# Patient Record
Sex: FEMALE | Race: WHITE | NOT HISPANIC OR LATINO | URBAN - METROPOLITAN AREA
[De-identification: names, ages, dates, MRNs, and addresses within clinical notes are randomized per-mention and may not be internally consistent; named-entity substitution may affect disease eponyms.]

---

## 2017-03-23 ENCOUNTER — COMMUNICATION - HEALTHEAST (OUTPATIENT)
Dept: MIDWIFE SERVICES | Facility: CLINIC | Age: 32
End: 2017-03-23

## 2017-03-23 ENCOUNTER — COMMUNICATION - HEALTHEAST (OUTPATIENT)
Dept: OBGYN | Facility: CLINIC | Age: 32
End: 2017-03-23

## 2017-07-19 ENCOUNTER — OFFICE VISIT - HEALTHEAST (OUTPATIENT)
Dept: FAMILY MEDICINE | Facility: CLINIC | Age: 32
End: 2017-07-19

## 2017-07-19 DIAGNOSIS — L98.9 SKIN LESION: ICD-10-CM

## 2017-07-19 DIAGNOSIS — L98.9 THUMB LESION: ICD-10-CM

## 2017-07-19 DIAGNOSIS — Z00.00 ROUTINE GENERAL MEDICAL EXAMINATION AT A HEALTH CARE FACILITY: ICD-10-CM

## 2017-07-19 DIAGNOSIS — F41.9 ANXIETY: ICD-10-CM

## 2017-07-19 DIAGNOSIS — F39 MOOD DISORDER (H): ICD-10-CM

## 2017-07-19 ASSESSMENT — MIFFLIN-ST. JEOR: SCORE: 1343.48

## 2017-07-24 ENCOUNTER — COMMUNICATION - HEALTHEAST (OUTPATIENT)
Dept: FAMILY MEDICINE | Facility: CLINIC | Age: 32
End: 2017-07-24

## 2017-07-24 DIAGNOSIS — F39 MOOD DISORDER (H): ICD-10-CM

## 2017-08-15 ENCOUNTER — RECORDS - HEALTHEAST (OUTPATIENT)
Dept: ADMINISTRATIVE | Facility: OTHER | Age: 32
End: 2017-08-15

## 2018-02-13 ENCOUNTER — COMMUNICATION - HEALTHEAST (OUTPATIENT)
Dept: FAMILY MEDICINE | Facility: CLINIC | Age: 33
End: 2018-02-13

## 2018-02-13 DIAGNOSIS — F39 MOOD DISORDER (H): ICD-10-CM

## 2018-05-14 ENCOUNTER — OFFICE VISIT - HEALTHEAST (OUTPATIENT)
Dept: FAMILY MEDICINE | Facility: CLINIC | Age: 33
End: 2018-05-14

## 2018-05-14 DIAGNOSIS — J32.9 SINUSITIS: ICD-10-CM

## 2018-08-21 ENCOUNTER — OFFICE VISIT - HEALTHEAST (OUTPATIENT)
Dept: FAMILY MEDICINE | Facility: CLINIC | Age: 33
End: 2018-08-21

## 2018-08-21 DIAGNOSIS — F41.9 ANXIETY: ICD-10-CM

## 2018-08-21 DIAGNOSIS — F32.4 MAJOR DEPRESSION IN PARTIAL REMISSION (H): ICD-10-CM

## 2018-08-21 DIAGNOSIS — Z00.00 ROUTINE GENERAL MEDICAL EXAMINATION AT A HEALTH CARE FACILITY: ICD-10-CM

## 2018-08-21 ASSESSMENT — MIFFLIN-ST. JEOR: SCORE: 1358.22

## 2018-10-17 ENCOUNTER — COMMUNICATION - HEALTHEAST (OUTPATIENT)
Dept: FAMILY MEDICINE | Facility: CLINIC | Age: 33
End: 2018-10-17

## 2019-01-07 ENCOUNTER — COMMUNICATION - HEALTHEAST (OUTPATIENT)
Dept: FAMILY MEDICINE | Facility: CLINIC | Age: 34
End: 2019-01-07

## 2019-01-07 DIAGNOSIS — F39 MOOD DISORDER (H): ICD-10-CM

## 2019-01-10 ENCOUNTER — COMMUNICATION - HEALTHEAST (OUTPATIENT)
Dept: FAMILY MEDICINE | Facility: CLINIC | Age: 34
End: 2019-01-10

## 2019-08-06 ENCOUNTER — OFFICE VISIT - HEALTHEAST (OUTPATIENT)
Dept: FAMILY MEDICINE | Facility: CLINIC | Age: 34
End: 2019-08-06

## 2019-08-06 DIAGNOSIS — F39 MOOD DISORDER (H): ICD-10-CM

## 2019-08-06 DIAGNOSIS — F41.9 ANXIETY: ICD-10-CM

## 2019-08-06 DIAGNOSIS — Z00.00 ROUTINE GENERAL MEDICAL EXAMINATION AT A HEALTH CARE FACILITY: ICD-10-CM

## 2019-08-06 ASSESSMENT — MIFFLIN-ST. JEOR: SCORE: 1372.29

## 2019-08-07 LAB
HPV SOURCE: NORMAL
HUMAN PAPILLOMA VIRUS 16 DNA: NEGATIVE
HUMAN PAPILLOMA VIRUS 18 DNA: NEGATIVE
HUMAN PAPILLOMA VIRUS FINAL DIAGNOSIS: NORMAL
HUMAN PAPILLOMA VIRUS OTHER HR: NEGATIVE
SPECIMEN DESCRIPTION: NORMAL

## 2019-08-13 LAB
BKR LAB AP ABNORMAL BLEEDING: NO
BKR LAB AP BIRTH CONTROL/HORMONES: NORMAL
BKR LAB AP CERVICAL APPEARANCE: NORMAL
BKR LAB AP GYN ADEQUACY: NORMAL
BKR LAB AP GYN INTERPRETATION: NORMAL
BKR LAB AP HPV REFLEX: NORMAL
BKR LAB AP LMP: NORMAL
BKR LAB AP PATIENT STATUS: NORMAL
BKR LAB AP PREVIOUS ABNORMAL: NORMAL
BKR LAB AP PREVIOUS NORMAL: 2014
HIGH RISK?: NO
PATH REPORT.COMMENTS IMP SPEC: NORMAL
RESULT FLAG (HE HISTORICAL CONVERSION): NORMAL

## 2019-09-18 ENCOUNTER — OFFICE VISIT - HEALTHEAST (OUTPATIENT)
Dept: FAMILY MEDICINE | Facility: CLINIC | Age: 34
End: 2019-09-18

## 2019-09-18 DIAGNOSIS — Z30.430 ENCOUNTER FOR IUD INSERTION: ICD-10-CM

## 2019-09-18 LAB — HCG UR QL: NEGATIVE

## 2019-11-07 ENCOUNTER — COMMUNICATION - HEALTHEAST (OUTPATIENT)
Dept: FAMILY MEDICINE | Facility: CLINIC | Age: 34
End: 2019-11-07

## 2020-02-28 ENCOUNTER — COMMUNICATION - HEALTHEAST (OUTPATIENT)
Dept: FAMILY MEDICINE | Facility: CLINIC | Age: 35
End: 2020-02-28

## 2020-03-02 ENCOUNTER — OFFICE VISIT - HEALTHEAST (OUTPATIENT)
Dept: FAMILY MEDICINE | Facility: CLINIC | Age: 35
End: 2020-03-02

## 2020-03-02 DIAGNOSIS — L70.0 ACNE VULGARIS: ICD-10-CM

## 2020-03-02 DIAGNOSIS — F39 MOOD DISORDER (H): ICD-10-CM

## 2020-03-02 ASSESSMENT — ANXIETY QUESTIONNAIRES
2. NOT BEING ABLE TO STOP OR CONTROL WORRYING: NOT AT ALL
GAD7 TOTAL SCORE: 1
1. FEELING NERVOUS, ANXIOUS, OR ON EDGE: NOT AT ALL
3. WORRYING TOO MUCH ABOUT DIFFERENT THINGS: NOT AT ALL
6. BECOMING EASILY ANNOYED OR IRRITABLE: SEVERAL DAYS
4. TROUBLE RELAXING: NOT AT ALL
5. BEING SO RESTLESS THAT IT IS HARD TO SIT STILL: NOT AT ALL
7. FEELING AFRAID AS IF SOMETHING AWFUL MIGHT HAPPEN: NOT AT ALL
IF YOU CHECKED OFF ANY PROBLEMS ON THIS QUESTIONNAIRE, HOW DIFFICULT HAVE THESE PROBLEMS MADE IT FOR YOU TO DO YOUR WORK, TAKE CARE OF THINGS AT HOME, OR GET ALONG WITH OTHER PEOPLE: NOT DIFFICULT AT ALL

## 2020-03-02 ASSESSMENT — PATIENT HEALTH QUESTIONNAIRE - PHQ9: SUM OF ALL RESPONSES TO PHQ QUESTIONS 1-9: 0

## 2020-08-16 ENCOUNTER — COMMUNICATION - HEALTHEAST (OUTPATIENT)
Dept: FAMILY MEDICINE | Facility: CLINIC | Age: 35
End: 2020-08-16

## 2020-08-16 DIAGNOSIS — F39 MOOD DISORDER (H): ICD-10-CM

## 2020-08-17 RX ORDER — SERTRALINE HYDROCHLORIDE 100 MG/1
TABLET, FILM COATED ORAL
Qty: 90 TABLET | Refills: 1 | Status: SHIPPED | OUTPATIENT
Start: 2020-08-17

## 2020-10-23 ENCOUNTER — RECORDS - HEALTHEAST (OUTPATIENT)
Dept: ADMINISTRATIVE | Facility: OTHER | Age: 35
End: 2020-10-23

## 2021-05-27 ASSESSMENT — PATIENT HEALTH QUESTIONNAIRE - PHQ9: SUM OF ALL RESPONSES TO PHQ QUESTIONS 1-9: 0

## 2021-05-28 ASSESSMENT — ANXIETY QUESTIONNAIRES: GAD7 TOTAL SCORE: 1

## 2021-05-31 VITALS — HEIGHT: 68 IN | WEIGHT: 135.13 LBS | BODY MASS INDEX: 20.48 KG/M2

## 2021-06-01 VITALS — WEIGHT: 143.5 LBS | BODY MASS INDEX: 22.14 KG/M2

## 2021-06-01 VITALS — WEIGHT: 138.38 LBS | HEIGHT: 68 IN | BODY MASS INDEX: 20.97 KG/M2

## 2021-06-03 VITALS — BODY MASS INDEX: 21.31 KG/M2 | HEIGHT: 68 IN | WEIGHT: 140.6 LBS

## 2021-06-03 VITALS
BODY MASS INDEX: 21.64 KG/M2 | SYSTOLIC BLOOD PRESSURE: 118 MMHG | TEMPERATURE: 98.3 F | DIASTOLIC BLOOD PRESSURE: 76 MMHG | HEART RATE: 88 BPM | WEIGHT: 141.3 LBS

## 2021-06-04 VITALS
BODY MASS INDEX: 22.55 KG/M2 | DIASTOLIC BLOOD PRESSURE: 64 MMHG | SYSTOLIC BLOOD PRESSURE: 100 MMHG | WEIGHT: 147.19 LBS | HEART RATE: 76 BPM

## 2021-06-06 NOTE — TELEPHONE ENCOUNTER
Reached out to patient and left a message to return phone call. Please relay the below message when she calls back. Thank you,   Makeda Prince

## 2021-06-06 NOTE — TELEPHONE ENCOUNTER
Patient Returning Call  Reason for call:  Returning phone call  Information relayed to patient:  Message below  Patient has additional questions:  Yes  If YES, what are your questions/concerns:  Patient is coming to the appointment she should be pulling up shortly.  Okay to leave a detailed message?: No call back needed

## 2021-06-06 NOTE — TELEPHONE ENCOUNTER
"How is her depression and anxiety?  Is she still taking sertraline?  Does she think she is \"competent to take Accutane while she is taking sertraline\"?  "

## 2021-06-06 NOTE — TELEPHONE ENCOUNTER
Who is requesting the letter?  Patient  Why is the letter needed?   Patient is requesting a Letter stating she is competent to take Accutane while she is taking sertraline.  This letter is for her [facility] Provider.  Thank you.  How would you like this letter returned? Patient will call back with fax number.  Okay to leave a detailed message? Yes

## 2021-06-06 NOTE — TELEPHONE ENCOUNTER
Reached out to patient and assisted with scheduling an appointment. Patient is questioning if the appointment can be changed to a phone visit. Please advise. Thank you, Makeda Prince

## 2021-06-06 NOTE — PROGRESS NOTES
ASSESSMENT/PLAN:  Acne vulgaris, Mood disorder (H)  This is a 35-year-old female who comes in today requesting a letter from her dermatologist for approval to start Accutane in the setting of depression and anxiety.  The patient has been stable on sertraline 100 mg.  PHQ 9 score of 0 and bailey 7 score 1.  There are no contraindications for her to start Accutane.  Letter was drafted and a copy was given to the patient.      SUBJECTIVE:  Kaylie Mortensen is a 35 y.o. female who came in today for depression and anxiety medication management. She takes 100 mg sertraline daily and reports her depression and anxiety is stable. She anticipates using isotretinoin for 5-6 months. She reports taking other topical medications but will discontinue when she starts isotretinoin.    Review of Systems (except those mentioned above)  Constitutional: Negative.   HENT: Negative.   Eyes: Negative.   Respiratory: Negative.   Cardiovascular: Negative.   Gastrointestinal: Negative.   Endocrine: Negative.   Genitourinary: Negative.   Musculoskeletal: Negative.   Skin: Negative.   Allergic/Immunologic: Negative.   Neurological: Negative.   Hematological: Negative.   Psychiatric/Behavioral: Negative.     Patient Active Problem List    Diagnosis Date Noted     Kyleena IUD 9/18/19 09/18/2019     Major depression in partial remission (H) 08/21/2018     Anxiety 07/19/2017     No Known Allergies  Current Outpatient Medications   Medication Sig Dispense Refill     levonorgestrel (KYLEENA) 17.5 mcg/24 hrs (5 yrs) 19.5 mg IUD IUD by Intrauterine route once. Inserted  9/18/19 by Dr Bernadette Mcfadden       sertraline (ZOLOFT) 100 MG tablet Take 1 tablet (100 mg total) by mouth daily. 90 tablet 3     No current facility-administered medications for this visit.      History reviewed. No pertinent past medical history.  History reviewed. No pertinent surgical history.  Social History     Socioeconomic History     Marital status:      Spouse name: None      Number of children: None     Years of education: None     Highest education level: None   Occupational History     None   Social Needs     Financial resource strain: None     Food insecurity:     Worry: None     Inability: None     Transportation needs:     Medical: None     Non-medical: None   Tobacco Use     Smoking status: Never Smoker     Smokeless tobacco: Never Used   Substance and Sexual Activity     Alcohol use: No     Drug use: No     Sexual activity: Yes   Lifestyle     Physical activity:     Days per week: 4 days     Minutes per session: 60 min     Stress: None   Relationships     Social connections:     Talks on phone: None     Gets together: None     Attends Nondenominational service: None     Active member of club or organization: None     Attends meetings of clubs or organizations: None     Relationship status: None     Intimate partner violence:     Fear of current or ex partner: None     Emotionally abused: None     Physically abused: None     Forced sexual activity: None   Other Topics Concern     None   Social History Narrative     None     Family History   Problem Relation Age of Onset     Melanoma Paternal Grandfather      Cervical cancer Mother      Diabetes type II Maternal Grandfather          OBJECTIVE:    Vitals:    03/02/20 1356   BP: 100/64   Pulse: 76   Weight: 147 lb 3 oz (66.8 kg)     Body mass index is 22.55 kg/m .    Physical Exam:  Constitutional: Patient is oriented to person, place, and time. Patient appears well-developed and well-nourished. No distress.   Head: Normocephalic and atraumatic.   Right Ear: External ear normal.   Left Ear: External ear normal.   Nose: Nose normal.   Eyes: Conjunctivae and EOM are normal. Right eye exhibits no discharge. Left eye exhibits no discharge. No scleral icterus.   Neurological: Patient is alert and oriented to person, place, and time. No cranial nerve deficit. Coordination normal.   Skin: No rash noted. Patient is not diaphoretic. No erythema.  No pallor.  The patient has good eye contact.  No psychomotor retardation or stereotypical behaviors.  Speech was regular rate, regular rhythm, adequate responses.  Mood was stable and affect was congruent mood.  No suicidal or homicidal intent.  No hallucination.    Results for orders placed or performed in visit on 09/18/19   Pregnancy, Urine   Result Value Ref Range    Pregnancy Test, Urine Negative Negative     ADDITIONAL HISTORY SUMMARIZED (2): None.  DECISION TO OBTAIN EXTRA INFORMATION (1): None.   RADIOLOGY TESTS (1): None.  LABS (1): None.  MEDICINE TESTS (1): None.  INDEPENDENT REVIEW (2 each): None.     Devendra HERNÁNDEZ am scribing for and in the presence of, Dr. Mcfadden.    I, Dr. Mcfadden, personally performed the services described in this documentation, as scribed by Devendra Hernandez in my presence, and it is both accurate and complete.    Total data points: 0

## 2021-06-06 NOTE — TELEPHONE ENCOUNTER
FYI - Status Update  Who is Calling: Patient  Update: Patient stated that the fax number is [phone]  Okay to leave a detailed message?:  Yes   [phone]

## 2021-06-10 NOTE — TELEPHONE ENCOUNTER
Refill Approved    Rx renewed per Medication Renewal Policy. Medication was last renewed on 8/6/9.    Brianna Lozano, [facility] Triage/Med Refill 8/17/2020     Requested Prescriptions   Pending Prescriptions Disp Refills     sertraline (ZOLOFT) 100 MG tablet [Pharmacy Med Name: SERTRALINE  MG TABLET] 90 tablet 3     Sig: TAKE 1 TABLET BY MOUTH EVERY DAY       [facility] Refill Protocol  Passed - 8/16/2020  7:43 AM        Passed - PCP or prescribing provider visit in last year     Last office visit with prescriber/PCP: 3/2/2020 Joe Hennessy MD OR same dept: 3/2/2020 Joe Hennessy MD OR same specialty: 3/2/2020 Joe Hennessy MD  Last physical: 8/6/2019 Last MTM visit: Visit date not found   Next visit within 3 mo: Visit date not found  Next physical within 3 mo: Visit date not found  Prescriber OR PCP: Joe Arevalo MD  Last diagnosis associated with med order: 1. Mood disorder (H)  - sertraline (ZOLOFT) 100 MG tablet [Pharmacy Med Name: SERTRALINE  MG TABLET]; TAKE 1 TABLET BY MOUTH EVERY DAY  Dispense: 90 tablet; Refill: 3    If protocol passes may refill for 12 months if within 3 months of last provider visit (or a total of 15 months).

## 2021-06-11 NOTE — PROGRESS NOTES
ASSESSMENT/PLAN:      1. Routine general medical examination at a health care facility  We discussed healthy lifestyle, nutrition, cardiovascular risk reduction, self care, safety, sunscreen, and seatbelt screening.  Health maintenance screening and immunizations reviewed with the patient.      2. Mood disorder  Started postpartum 10 months ago.  She is doing better.  She is not sure about her dose.  She will call with the correct sertraline dose.  I would be more than happy to refill this for the patient.  I asked that she seek psychotherapy see if this will be helpful for her as well  - Ambulatory referral to Psychology    3. Anxiety  Started postpartum 10 months ago.  She is doing better.  She is not sure about her dose.  She will call with the correct sertraline dose.  I would be more than happy to refill this for the patient.  I asked that she seek psychotherapy see if this will be helpful for her as well  - Ambulatory referral to Psychology    4. Contraception  We will switch Micronor over to Sprintec  - norgestimate-ethinyl estradiol (SPRINTEC, 28,) 0.25-35 mg-mcg per tablet; Take 1 tablet by mouth daily.  Dispense: 84 tablet; Refill: 3    5. Skin lesion  Suspect nevus versus skin tag.  She has a family history of skin cancer.  As result, I will like her to see dermatology for second opinion and also for a skin cancer screening  - Ambulatory referral to Dermatology    6. Thumb lesion  Suspect herpetic kaitlin versus hyperkeratosis.  Recommend soaking and filing the area consistently.  If she has questions or if there are further symptoms then to call.    SUBJECTIVE:  Kaylie Mortensen is a 32 y.o. female who is here for a health maintenance visit. Her blood pressure and pulse are within normal limits. Her BMI is 20. Her PAP smear is up to date. Her immunizations are up to date.     REVIEW OF SYSTEMS:   Anxiety/depression stable on sertraline 100mg. She may be interested in seeing a therapist. She stopped  "nursing her twins one week ago. She has noticed a hard lump a few days ago, and she feels like it is a clogged milk duct. Denies chest pain, shortness of breath, dysphagia. Hearing and vision are stable. She has a skin lesion on abdomen that she has had for a while, but she feels it has grown. All other systems are negative.    PFSH:  Past medical history of gestational diabetes and HELLP syndrome.   Family history of cervical cancer in mother.     History   Smoking Status     Never Smoker   Smokeless Tobacco     Not on file       Family History   Problem Relation Age of Onset     Cancer Paternal Grandfather      melanoma     Cancer Mother      cervical     Diabetes Maternal Grandfather      Type II       No Known Allergies      OBJECTIVE:   Physical Exam   Vitals:    07/19/17 1306   BP: 98/64   Patient Site: Left Arm   Patient Position: Sitting   Cuff Size: Adult Regular   Pulse: 68   Weight: 135 lb 2 oz (61.3 kg)   Height: 5' 7.5\" (1.715 m)     Estimated body mass index is 20.85 kg/(m^2) as calculated from the following:    Height as of this encounter: 5' 7.5\" (1.715 m).    Weight as of this encounter: 135 lb 2 oz (61.3 kg).    Constitutional: Patient is oriented to person, place, and time. Patient appears well-developed and well-nourished. No distress.   Head: Normocephalic and atraumatic.   Right Ear: External ear normal. Ear canal and TM normal.   Left Ear: External ear normal. Ear canal and TM normal.   Nose: Nose normal.   Mouth/Throat: Oropharynx is clear and moist. No oropharyngeal exudate.   Eyes: Conjunctivae and EOM are normal. Pupils are equal, round, and reactive to light. Right eye exhibits no discharge. Left eye exhibits no discharge. No scleral icterus.   Neck: Neck supple. No JVD present. No tracheal deviation present. No thyromegaly present.   Breasts:  Normal appearing, no skin involvement, no tenderness on palpation.  No axillary involvement. Palpable nodule on inferior apect of left breast, " measuring 1cm below areola at the 6 o' clock position.  Cardiovascular: Normal rate, regular rhythm, normal heart sounds and intact distal pulses. No murmur heard.   Pulmonary/Chest: Effort normal and breath sounds normal. No stridor. No respiratory distress. Patient has no wheezes, no rales, exhibits no tenderness.   Abdominal: Soft. Bowel sounds are normal. Patient exhibits no distension and no mass. There is no tenderness. There is no rebound and no guarding.   Lymphadenopathy:  Patient has no cervical adenopathy.   Neurological: Patient is alert and oriented to person, place, and time. Patient has normal reflexes. No cranial nerve deficit. Coordination normal.   Skin: Skin is warm and dry. No rash noted. Patient is not diaphoretic. No erythema. No pallor. Hyperpigmented nodular skin lesion on the left lower quadrant of abdomen, measuring about 1cm.  2mm hyperkeratotic skin lesion on thumb, not erythematous  Pelvic:  Not done.   Psychiatric: Patient has good eye contact without any psychomotor retardation or stereotypic behaviors.  normal mood and affect. Judgment and thought content normal.   Speech is regular rate and rhythm.       ADDITIONAL HISTORY SUMMARIZED (2): None.  DECISION TO OBTAIN EXTRA INFORMATION (1): None.   RADIOLOGY TESTS (1): None.  LABS (1): None.  MEDICINE TESTS (1): None.  INDEPENDENT REVIEW (2 each): None.     The visit lasted a total of 16 minutes face to face with the patient. Over 50% of the time was spent counseling and educating the patient about health maintenance.    I, Anat Hodge, am scribing for and in the presence of, Dr. Mcfadden.    I, Joe Arevalo MD , personally performed the services described in this documentation, as scribed by Anat Hodge in my presence, and it is both accurate and complete.      MEDICATIONS:  Current Outpatient Prescriptions   Medication Sig Dispense Refill     norethindrone (MICRONOR) 0.35 mg tablet   4      sertraline (ZOLOFT) 100 MG tablet   0     norgestimate-ethinyl estradiol (SPRINTEC, 28,) 0.25-35 mg-mcg per tablet Take 1 tablet by mouth daily. 84 tablet 3     No current facility-administered medications for this visit.          Total data points: 0

## 2021-06-16 PROBLEM — F41.9 ANXIETY: Status: ACTIVE | Noted: 2017-07-19

## 2021-06-16 PROBLEM — Z30.430 ENCOUNTER FOR IUD INSERTION: Status: ACTIVE | Noted: 2019-09-18

## 2021-06-16 PROBLEM — F32.4 MAJOR DEPRESSION IN PARTIAL REMISSION (H): Status: ACTIVE | Noted: 2018-08-21

## 2021-06-16 NOTE — TELEPHONE ENCOUNTER
Telephone Encounter by Kisha Cano LPN at 3/2/2020 12:56 PM     Author: Kisha Cano LPN Service: -- Author Type: Licensed Nurse    Filed: 3/2/2020 12:58 PM Encounter Date: 2/28/2020 Status: Signed    : Kisha Cano LPN (Licensed Nurse)       Patient Returning Call  Reason for call:  Patient returning call   Information relayed to patient:  Makeda Prince CMA           9:19 AM   Note      Reached out to patient and assisted with scheduling an appointment. Patient is questioning if the appointment can be changed to a phone visit. Please advise. Thank you, Makeda Prince                 9:15 AM      Makeda Prince CMA contacted Kaylie Mortensen Shoua Theresa, MD   to [facility] Care Team [place]         7:49 AM   Note      Please assist the patient with an appointment with me.  Thank you.  Have an awesome day.         Patient has additional questions:  Yes  If YES, what are your questions/concerns:  Patient states her appointment is today requesting if she can do by phone please advise as soon as possible .   Okay to leave a detailed message?: No

## 2021-06-18 NOTE — PROGRESS NOTES
ASSESSMENT/PLAN:    Sinusitis  Pleasant 33 y.o.  female presented with 2-3 wks of sinusitis and URI.  I will give her azithromycin.  The patient may continue with OTC symptomatic treatment.  Rest, hydration, nutritional support, and time were counseled.  Follow up if the patient is not better in 1-2 weeks.  The patient verbalized understanding and agreed with the plan.  -     azithromycin (ZITHROMAX Z-CONCHITA) 250 MG tablet; Take 2 tablets (500 mg) on  Day 1,  followed by 1 tablet (250 mg) once daily on Days 2 through 5.  Dispense: 6 tablet; Refill: 0    CHIEF COMPLAINT:  Chief Complaint   Patient presents with     Cough     x few days 5/3/18, productive cough     Headache     facial pressure     bodyache       HISTORY OF PRESENT ILLNESS:  Kaylie is a 33 y.o. female presenting to the clinic today or a cough. She has had cough and cold for the past 10 days. It started with cough, and then she had congestion and productive cough. She took sudafed and ibuprofen without much relief. She did try some Mucinex, but again no relief of cough or congestion. She thought that she was improving, but started to then feel feverish, feeling hot and chilled. She has had headaches and facial pain and pressure. She still has cough, but feels it is 50% better since onset. The cough seems productive. She does feel short of breath and fatigued. She is still having runny nose mixed with congestion. Denies nausea, vomiting, abdominal pain, diarrhea. She is unsure about seasonal or environmental allergies. Vitals are stable today.     REVIEW OF SYSTEMS:   Eyes: Negative.   Cardiovascular: Negative.   Gastrointestinal: Negative.   Endocrine: Negative.   Genitourinary: Negative.   Musculoskeletal: Negative.   Skin: Negative.   Allergic/Immunologic: Negative.   Neurological: Negative.   Hematological: Negative.   Psychiatric/Behavioral: Negative.   All other systems are negative.    PFSH:  No new history.     TOBACCO USE:  History   Smoking  Status     Never Smoker   Smokeless Tobacco     Never Used       VITALS:  Vitals:    05/14/18 1153   BP: 116/68   Pulse: 82   Temp: 98.3  F (36.8  C)   TempSrc: Oral   SpO2: 97%   Weight: 143 lb 8 oz (65.1 kg)     Wt Readings from Last 3 Encounters:   05/14/18 143 lb 8 oz (65.1 kg)   07/19/17 135 lb 2 oz (61.3 kg)   08/29/16 190 lb (86.2 kg)       PHYSICAL EXAM:  Constitutional: Patient is oriented to person, place, and time. Patient appears well-developed and well-nourished. No distress.   Head: Normocephalic and atraumatic.   Right Ear: External ear normal. Ear canal and TM normal.   Left Ear: External ear normal. Ear canal and TM normal.   Nose: Mucositis bilaterally.   Mouth/Throat: Oropharynx is clear and moist. No oropharyngeal exudate.   Eyes: Conjunctivae and EOM are normal. Pupils are equal, round, and reactive to light. Right eye exhibits no discharge. Left eye exhibits no discharge. No scleral icterus.   Cardiovascular: Normal rate, regular rhythm, normal heart sounds and intact distal pulses. No murmur heard.   Pulmonary/Chest: Effort normal and breath sounds normal. No stridor. No respiratory distress. Patient has no wheezes, no rales, exhibits no tenderness.     ADDITIONAL HISTORY SUMMARIZED (2): None.  DECISION TO OBTAIN EXTRA INFORMATION (1): None.   RADIOLOGY TESTS (1): None.  LABS (1): None.  MEDICINE TESTS (1): None.  INDEPENDENT REVIEW (2 each): None.     IAnat, am scribing for and in the presence of, Dr. Mcfadden.    Joe HERNÁNDEZ MD , personally performed the services described in this documentation, as scribed by Anat Hodge in my presence, and it is both accurate and complete.    MEDICATIONS:  Current Outpatient Prescriptions   Medication Sig Dispense Refill     norgestimate-ethinyl estradiol (SPRINTEC, 28,) 0.25-35 mg-mcg per tablet Take 1 tablet by mouth daily. 84 tablet 3     sertraline (ZOLOFT) 100 MG tablet TAKE ONE TABLET BY MOUTH ONCE  DAILY 90 tablet 1     azithromycin (ZITHROMAX Z-CONCHITA) 250 MG tablet Take 2 tablets (500 mg) on  Day 1,  followed by 1 tablet (250 mg) once daily on Days 2 through 5. 6 tablet 0     No current facility-administered medications for this visit.        Total data points: 0

## 2021-06-20 NOTE — PROGRESS NOTES
FEMALE PREVENTATIVE EXAM    Assessment and Plan:     Routine general medical examination at a health care facility  We discussed healthy lifestyle, nutrition, cardiovascular risk reduction, self care, safety, sunscreen, and seatbelt screening.  Health maintenance screening and immunizations reviewed with the patient.     Major depression in partial remission (H), Anxiety  PHQ9=2, GAD7=0  Okay to wean down to 50 mg of sertraline for 1 month and if she is still feeling well then she may wean off     Contraception and change in menstrual cycles  We will switch monophasic (Sprintec) to that of biphasic (Ortho Tri-Cyclen).  If she continues to have irregular vaginal bleeding in the next 3-6 months then to call for which then I will recommend doing further evaluation including that of pelvic ultrasound.  Patient verbalized understanding and agreed with the plan  -     norgestimate-ethinyl estradiol (ORTHO TRI-CYCLEN, 28,) 0.18/0.215/0.25 mg-35 mcg (28) Tab tablet; Take 1 tablet by mouth daily.    Next follow up: One year    Immunization Review  Adult Imm Review: No immunizations due today    I discussed the following with the patient:   Adult Healthy Living: Importance of regular exercise  Healthy nutrition      Subjective:   Chief Complaint: Kaylie Mortensen is an 33 y.o. female here for a preventative health visit.     HPI: 33-year-old female with major depression and generalized anxiety disorder presented today for her annual preventive health visit.  She has been on sertraline 100 mg for quite some time and is feeling very well.  She would like to wean down and off of sertraline.  This is also reflective in her responses to the PHQ9 (2) and GAD7 (0).      She is also taking Sprintec for contraception.  Over the last 6 months she has noted that she is bleeding longer.  Vaginal bleeding usually starts on the third week of her hormones into the placebo week of her pills.  She has no other intermenstrual vaginal bleeding  "concerns.  She does not have any concerns regarding sexually transmitted infections.    She has joined weight watcher in the attempts to shed off a few pounds.  She has lost about 5 pounds over the last couple months.  Her body mass index is 21    Healthy Habits  Are you taking a daily aspirin? No  Do you typically exercising at least 40 min, 3-4 times per week?  Yes  Do you usually eat at least 4 servings of fruit and vegetables a day, include whole grains and fiber and avoid regularly eating high fat foods? Yes  Have you had an eye exam in the past two years? Yes  Do you see a dentist twice per year? Yes  Do you have any concerns regarding sleep? No    Safety Screen  If you own firearms, are they secured in a locked gun cabinet or with trigger locks? The patient does not own any firearms  Do you feel you are safe where you are living?: Yes (8/21/2018  1:28 PM)  Do you feel you are safe in your relationship(s)?: Yes (8/21/2018  1:28 PM)    Review of Systems:  Please see above.  The rest of the review of systems are negative for all systems.     Pap History:   Yes - updated in Problem List and Health Maintenance accordingly  Cancer Screening       Status Date      PAP SMEAR Next Due 7/29/2019      Done 7/29/2014 GYNECOLOGIC CYTOLOGY (PAP SMEAR)     Patient has more history with this topic...          Patient Care Team:  Joe Arevalo MD as PCP - General        History     Reviewed By Date/Time Sections Reviewed    Paula Soriano CMA 8/21/2018  1:28 PM Tobacco            Objective:   Vital Signs:   Visit Vitals     /64     Pulse 68     Ht 5' 7.5\" (1.715 m)     Wt 138 lb 6 oz (62.8 kg)     LMP 08/14/2018     BMI 21.35 kg/m2          PHYSICAL EXAM    Objective:    Physical Exam   Vitals:    08/21/18 1328   BP: 106/64   Pulse: 68      Constitutional: Patient is oriented to person, place, and time. Patient appears well-developed and well-nourished. No distress.   Head: Normocephalic and atraumatic. "   Right Ear: External ear normal.   Left Ear: External ear normal.   Nose: Nose normal.   Mouth/Throat: Oropharynx is clear and moist. No oropharyngeal exudate.   Eyes: Conjunctivae and EOM are normal. Pupils are equal, round, and reactive to light. Right eye exhibits no discharge. Left eye exhibits no discharge. No scleral icterus.   Neck: Neck supple. No JVD present. No tracheal deviation present. No thyromegaly present.   Cardiovascular: Normal rate, regular rhythm, normal heart sounds and intact distal pulses. No murmur heard.   Pulmonary/Chest: Effort normal and breath sounds normal. No stridor. No respiratory distress. Patient has no wheezes, no rales, exhibits no tenderness.   Abdominal: Soft. Bowel sounds are normal. Patient exhibits no distension and no mass. There is no tenderness. There is no rebound and no guarding.   Lymphadenopathy:  Patient has no cervical adenopathy.   Neurological: Patient is alert and oriented to person, place, and time. Patient has normal reflexes. No cranial nerve deficit. Coordination normal.   Skin: Skin is warm and dry. No rash noted. Patient is not diaphoretic. No erythema. No pallor.   Normal breast exam           Medication List          These changes are accurate as of 8/21/18  1:51 PM.  If you have any questions, ask your nurse or doctor.               START taking these medications          norgestimate-ethinyl estradiol 0.18/0.215/0.25 mg-35 mcg (28) Tab tablet   Also known as:  ORTHO TRI-CYCLEN (28)   INSTRUCTIONS:  Take 1 tablet by mouth daily.   Replaces:  norgestimate-ethinyl estradiol 0.25-35 mg-mcg per tablet   Started by:  Joe Arevalo MD             CONTINUE taking these medications          sertraline 100 MG tablet   Also known as:  ZOLOFT   INSTRUCTIONS:  TAKE ONE TABLET BY MOUTH ONCE DAILY             STOP taking these medications          norgestimate-ethinyl estradiol 0.25-35 mg-mcg per tablet   Also known as:  SPRINTEC (28)   Replaced by:   norgestimate-ethinyl estradiol 0.18/0.215/0.25 mg-35 mcg (28) Tab tablet   Stopped by:  Joe Arevalo MD            Where to Get Your Medications      These medications were sent to [facility] [id number] [place] [facility] [address] [place] [address]  [address] [place] [address]     Phone:  [phone]      norgestimate-ethinyl estradiol 0.18/0.215/0.25 mg-35 mcg (28) Tab tablet             Additional Screenings Completed Today:

## 2021-06-20 NOTE — LETTER
Letter by Joe Hennessy MD at      Author: Joe Hennessy MD Service: -- Author Type: --    Filed:  Encounter Date: 3/2/2020 Status: (Other)         March 2, 2020     Patient: Kaylie Mortensen   YOB: 1985   Date of Visit: 3/2/2020       To Whom it May Concern:    Kaylie Mortensen was seen in my clinic on 3/2/2020.  She is a patient of this clinic.  I serve as her primary care physician.  She is currently taking sertraline.  Her depression and anxiety symptoms are under good control.  There are no contraindications to taking Accutane.      If you have any questions or concerns, please don't hesitate to call.    Sincerely,         Electronically signed by Joe Arevalo MD

## 2021-06-27 ENCOUNTER — HEALTH MAINTENANCE LETTER (OUTPATIENT)
Age: 36
End: 2021-06-27

## 2021-06-27 NOTE — PROGRESS NOTES
Progress Notes by Joe Hennessy MD at 8/6/2019  1:40 PM     Author: Joe Hennessy MD Service: -- Author Type: Physician    Filed: 8/6/2019  4:29 PM Encounter Date: 8/6/2019 Status: Signed    : Joe Hennessy MD (Physician)       FEMALE PREVENTATIVE EXAM    Assessment and Plan:       Routine general medical examination at a health care facility  -     Gynecologic Cytology (PAP Smear)  We discussed healthy lifestyle, nutrition, cardiovascular risk reduction, self care, safety, sunscreen, seatbelt, and timing of cancer screening.  Health maintenance screening and immunizations reviewed with the patient.    Mood disorder (H), anxiety  refilled  -     sertraline (ZOLOFT) 100 MG tablet; Take 1 tablet (100 mg total) by mouth daily.    Contraception  Spoke about various forms of birth control from oral, transdermal, intravaginal, intramuscular, subdermal, intrauterine.  Also spoke about hormonal versus nonhormonal form of birth control.  Patient verbalized interest in the Mirena or Kyleena IUD (not sure).  We spoke about the procedure, risks and benefit, and timing of insertion.  She will come back during her period week for insertion of IUD.  Patient verbalized understanding and agree with plan    Next follow up:  Return in about 1 year (around 8/6/2020).    I discussed the following with the patient:   Adult Healthy Living: Contraception options        Subjective:   Chief Complaint: Kaylie Mortensen is an 34 y.o. female here for a preventative health visit.     HPI:  The patient address that she has been taking birth control pills, however, states that at the end of her cycle, approximately the last 3 days, she would find some menstrual spotting. She express desire to transition over to an IUD. The patient reports that she had her last pap smear in 2013 and that her last period was on July 22nd, 2019. She confirms that she would get heavy menstruation every other cycle  "and recently notice she has varicose veins and express desire to get it cosmetically treated.     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 She notices the pain after her maternity leave.     ROS:  Negative: abnormal bleeding  Review of systems negative excepted as noted above or in the HPI    PMFHSx:  Mother has varicose veins.     Healthy Habits  Are you taking a daily aspirin? No  Do you typically exercising at least 40 min, 3-4 times per week?  Yes  Do you usually eat at least 4 servings of fruit and vegetables a day, include whole grains and fiber and avoid regularly eating high fat foods? Yes  Have you had an eye exam in the past two years? Yes  Do you see a dentist twice per year? Yes  Do you have any concerns regarding sleep? YES    Safety Screen  If you own firearms, are they secured in a locked gun cabinet or with trigger locks?  owns a gun, not loaded-  Do you feel you are safe where you are living?: Yes (8/21/2018  1:28 PM)  Do you feel you are safe in your relationship(s)?: Yes (8/21/2018  1:28 PM)        Pap History:   Yes - updated in Problem List and Health Maintenance accordingly  Cancer Screening       Status Date      PAP SMEAR Overdue 7/29/2019      Done 7/29/2014 GYNECOLOGIC CYTOLOGY (PAP SMEAR)     Patient has more history with this topic...          Patient Care Team:  Joe Hennessy MD as PCP - General        History     Reviewed By Date/Time Sections Reviewed    Tiffany Alexander, [facility] 8/6/2019  1:43 PM Tobacco            Objective:   Vital Signs:   Visit Vitals  BP 96/70 (Patient Site: Left Arm, Patient Position: Sitting, Cuff Size: Adult Regular)   Pulse 75   Temp 98.1  F (36.7  C) (Oral)   Ht 5' 7.75\" (1.721 m)   Wt 140 lb 9.6 oz (63.8 kg)   LMP 07/22/2019   BMI 21.54 kg/m           PHYSICAL EXAM  Breasts:  Normal appearing, no skin involvement, no " palpable mass, no tenderness on palpation.  No axillary involvement  Pelvic:  Normal external genitalia with Normal vulva.  Normal vagina with no polyps or lesions and with physiologic discharge.  Normal cervix with normal mucosa and without CMT.  No adnexal masses           Medication List           Accurate as of 8/6/19  4:28 PM. If you have any questions, ask your nurse or doctor.               CONTINUE taking these medications    norgestimate-ethinyl estradiol 0.18/0.215/0.25 mg-35 mcg (28) Tab tablet  Also known as:  ORTHO TRI-CYCLEN (28)  INSTRUCTIONS:  Take 1 tablet by mouth daily.        sertraline 100 MG tablet  Also known as:  ZOLOFT  INSTRUCTIONS:  Take 1 tablet (100 mg total) by mouth daily.              Where to Get Your Medications      These medications were sent to [place] [address] [place] [address] [address]    Phone:  [phone]     sertraline 100 MG tablet         Additional Screenings Completed Today:   ADDITIONAL HISTORY SUMMARIZED (2): None.  DECISION TO OBTAIN EXTRA INFORMATION (1): None  RADIOLOGY TESTS (1): None.  LABS (1): Pap smear ordered today.  MEDICINE TESTS (1): None.  INDEPENDENT REVIEW (2 each): None.     Total data points: 1    The visit lasted a total of 18 minutes face to face with the patient. Over 50% of the time was spent counseling and educating the patient about contraception options.     IAnat am scribing for and in the presence of, Dr. Mcfadden on  8/6/2019.     IDr. Mcfadden, personally performed the services described in this documentation, as scribed by Anat Rivera in my presence, and it is both accurate and complete.

## 2021-07-03 NOTE — ADDENDUM NOTE
Addendum Note by Mayelin Steen CMA at 8/7/2017 11:50 AM     Author: Mayelin Steen CMA Service: -- Author Type: Medical Assistant    Filed: 8/7/2017 11:50 AM Encounter Date: 7/24/2017 Status: Signed    : Mayelin Steen CMA (Medical Assistant)    Addended by: MAYELIN STEEN on: 8/7/2017 11:50 AM        Modules accepted: Orders

## 2021-10-17 ENCOUNTER — HEALTH MAINTENANCE LETTER (OUTPATIENT)
Age: 36
End: 2021-10-17

## 2022-07-24 ENCOUNTER — HEALTH MAINTENANCE LETTER (OUTPATIENT)
Age: 37
End: 2022-07-24

## 2022-10-02 ENCOUNTER — HEALTH MAINTENANCE LETTER (OUTPATIENT)
Age: 37
End: 2022-10-02

## 2023-08-12 ENCOUNTER — HEALTH MAINTENANCE LETTER (OUTPATIENT)
Age: 38
End: 2023-08-12